# Patient Record
Sex: FEMALE | Race: WHITE | Employment: FULL TIME | URBAN - METROPOLITAN AREA
[De-identification: names, ages, dates, MRNs, and addresses within clinical notes are randomized per-mention and may not be internally consistent; named-entity substitution may affect disease eponyms.]

---

## 2018-08-14 ENCOUNTER — MEDICAL CORRESPONDENCE (OUTPATIENT)
Dept: HEALTH INFORMATION MANAGEMENT | Facility: CLINIC | Age: 38
End: 2018-08-14

## 2018-08-15 ENCOUNTER — PRE VISIT (OUTPATIENT)
Dept: ONCOLOGY | Facility: CLINIC | Age: 38
End: 2018-08-15

## 2018-08-15 NOTE — TELEPHONE ENCOUNTER
Date of appointment: 8/23/18   Diagnosis/reason for appointment:  Easy Bruising  Referring provider/facility:  [facility], Dr. Kathe Galvan  Who called:  Call to PT    Additional information:    August 15, 2018   Records from [facility] and [facility]  in [place]  Referral from [facility] faxed to HIM

## 2018-08-23 ENCOUNTER — HOSPITAL ENCOUNTER (OUTPATIENT)
Facility: CLINIC | Age: 38
Setting detail: SPECIMEN
Discharge: HOME | End: 2018-08-23
Attending: INTERNAL MEDICINE | Admitting: INTERNAL MEDICINE
Payer: COMMERCIAL

## 2018-08-23 ENCOUNTER — ONCOLOGY VISIT (OUTPATIENT)
Dept: ONCOLOGY | Facility: CLINIC | Age: 38
End: 2018-08-23
Attending: INTERNAL MEDICINE
Payer: COMMERCIAL

## 2018-08-23 ENCOUNTER — HOSPITAL ENCOUNTER (OUTPATIENT)
Facility: CLINIC | Age: 38
Setting detail: SPECIMEN
End: 2018-08-23
Attending: INTERNAL MEDICINE
Payer: COMMERCIAL

## 2018-08-23 ENCOUNTER — TELEPHONE (OUTPATIENT)
Dept: ONCOLOGY | Facility: CLINIC | Age: 38
End: 2018-08-23

## 2018-08-23 VITALS
TEMPERATURE: 98.6 F | SYSTOLIC BLOOD PRESSURE: 138 MMHG | OXYGEN SATURATION: 100 % | RESPIRATION RATE: 18 BRPM | HEART RATE: 121 BPM | DIASTOLIC BLOOD PRESSURE: 93 MMHG | WEIGHT: 199 LBS

## 2018-08-23 DIAGNOSIS — R23.3 EASY BRUISING: ICD-10-CM

## 2018-08-23 LAB
ALBUMIN SERPL-MCNC: 3.5 G/DL (ref 3.4–5)
ALP SERPL-CCNC: 104 U/L (ref 40–150)
ALT SERPL W P-5'-P-CCNC: 40 U/L (ref 0–50)
ANION GAP SERPL CALCULATED.3IONS-SCNC: 12 MMOL/L (ref 3–14)
APTT PPP: 24 SEC (ref 22–37)
AST SERPL W P-5'-P-CCNC: 42 U/L (ref 0–45)
BASOPHILS # BLD AUTO: 0.1 10E9/L (ref 0–0.2)
BASOPHILS NFR BLD AUTO: 0.4 %
BILIRUB SERPL-MCNC: 0.4 MG/DL (ref 0.2–1.3)
BUN SERPL-MCNC: 19 MG/DL (ref 7–30)
CALCIUM SERPL-MCNC: 9.3 MG/DL (ref 8.5–10.1)
CHLORIDE SERPL-SCNC: 101 MMOL/L (ref 94–109)
CLOSURE TME COLL+EPINEP BLD: NORMAL SEC
CO2 SERPL-SCNC: 22 MMOL/L (ref 20–32)
COPATH REPORT: NORMAL
CREAT SERPL-MCNC: 1.08 MG/DL (ref 0.52–1.04)
DIFFERENTIAL METHOD BLD: ABNORMAL
EOSINOPHIL # BLD AUTO: 0.1 10E9/L (ref 0–0.7)
EOSINOPHIL NFR BLD AUTO: 0.4 %
ERYTHROCYTE [DISTWIDTH] IN BLOOD BY AUTOMATED COUNT: 13.6 % (ref 10–15)
FERRITIN SERPL-MCNC: 67 NG/ML (ref 12–150)
FOLATE SERPL-MCNC: 9.7 NG/ML
GFR SERPL CREATININE-BSD FRML MDRD: 57 ML/MIN/1.7M2
GLUCOSE SERPL-MCNC: 84 MG/DL (ref 70–99)
HCT VFR BLD AUTO: 44.1 % (ref 35–47)
HGB BLD-MCNC: 14.5 G/DL (ref 11.7–15.7)
IMM GRANULOCYTES # BLD: 0.2 10E9/L (ref 0–0.4)
IMM GRANULOCYTES NFR BLD: 1.3 %
INR PPP: 1.01 (ref 0.86–1.14)
IRON SATN MFR SERPL: 47 % (ref 15–46)
IRON SERPL-MCNC: 128 UG/DL (ref 35–180)
LDH SERPL L TO P-CCNC: 220 U/L (ref 81–234)
LYMPHOCYTES # BLD AUTO: 4 10E9/L (ref 0.8–5.3)
LYMPHOCYTES NFR BLD AUTO: 26.6 %
MCH RBC QN AUTO: 31.3 PG (ref 26.5–33)
MCHC RBC AUTO-ENTMCNC: 32.9 G/DL (ref 31.5–36.5)
MCV RBC AUTO: 95 FL (ref 78–100)
MONOCYTES # BLD AUTO: 1.1 10E9/L (ref 0–1.3)
MONOCYTES NFR BLD AUTO: 7.5 %
NEUTROPHILS # BLD AUTO: 9.5 10E9/L (ref 1.6–8.3)
NEUTROPHILS NFR BLD AUTO: 63.8 %
NRBC # BLD AUTO: 0 10*3/UL
NRBC BLD AUTO-RTO: 0 /100
PLATELET # BLD AUTO: 245 10E9/L (ref 150–450)
POTASSIUM SERPL-SCNC: 4.3 MMOL/L (ref 3.4–5.3)
PROT SERPL-MCNC: 6.8 G/DL (ref 6.8–8.8)
RBC # BLD AUTO: 4.64 10E12/L (ref 3.8–5.2)
RETICS # AUTO: 152.7 10E9/L (ref 25–95)
RETICS/RBC NFR AUTO: 3.3 % (ref 0.5–2)
SODIUM SERPL-SCNC: 135 MMOL/L (ref 133–144)
T4 FREE SERPL-MCNC: 0.99 NG/DL (ref 0.76–1.46)
TIBC SERPL-MCNC: 274 UG/DL (ref 240–430)
TSH SERPL DL<=0.005 MIU/L-ACNC: 6.21 MU/L (ref 0.4–4)
VIT B12 SERPL-MCNC: 639 PG/ML (ref 193–986)
WBC # BLD AUTO: 14.9 10E9/L (ref 4–11)

## 2018-08-23 PROCEDURE — 80053 COMPREHEN METABOLIC PANEL: CPT | Performed by: INTERNAL MEDICINE

## 2018-08-23 PROCEDURE — 86704 HEP B CORE ANTIBODY TOTAL: CPT | Performed by: INTERNAL MEDICINE

## 2018-08-23 PROCEDURE — 40000847 ZZHCL STATISTIC MORPHOLOGY W/INTERP HISTOLOGY TC 85060: Performed by: INTERNAL MEDICINE

## 2018-08-23 PROCEDURE — G0463 HOSPITAL OUTPT CLINIC VISIT: HCPCS

## 2018-08-23 PROCEDURE — 00000402 ZZHCL STATISTIC TOTAL PROTEIN: Performed by: INTERNAL MEDICINE

## 2018-08-23 PROCEDURE — 82746 ASSAY OF FOLIC ACID SERUM: CPT | Performed by: INTERNAL MEDICINE

## 2018-08-23 PROCEDURE — 86706 HEP B SURFACE ANTIBODY: CPT | Performed by: INTERNAL MEDICINE

## 2018-08-23 PROCEDURE — 83615 LACTATE (LD) (LDH) ENZYME: CPT | Performed by: INTERNAL MEDICINE

## 2018-08-23 PROCEDURE — 85240 CLOT FACTOR VIII AHG 1 STAGE: CPT | Performed by: INTERNAL MEDICINE

## 2018-08-23 PROCEDURE — 83540 ASSAY OF IRON: CPT | Performed by: INTERNAL MEDICINE

## 2018-08-23 PROCEDURE — 85245 CLOT FACTOR VIII VW RISTOCTN: CPT | Performed by: INTERNAL MEDICINE

## 2018-08-23 PROCEDURE — 85045 AUTOMATED RETICULOCYTE COUNT: CPT | Performed by: INTERNAL MEDICINE

## 2018-08-23 PROCEDURE — 00000401 ZZHCL STATISTIC THROMBIN TIME NC: Performed by: INTERNAL MEDICINE

## 2018-08-23 PROCEDURE — 85025 COMPLETE CBC W/AUTO DIFF WBC: CPT | Performed by: INTERNAL MEDICINE

## 2018-08-23 PROCEDURE — 85613 RUSSELL VIPER VENOM DILUTED: CPT | Performed by: INTERNAL MEDICINE

## 2018-08-23 PROCEDURE — 85060 BLOOD SMEAR INTERPRETATION: CPT | Performed by: INTERNAL MEDICINE

## 2018-08-23 PROCEDURE — 36415 COLL VENOUS BLD VENIPUNCTURE: CPT

## 2018-08-23 PROCEDURE — 82607 VITAMIN B-12: CPT | Performed by: INTERNAL MEDICINE

## 2018-08-23 PROCEDURE — 85610 PROTHROMBIN TIME: CPT | Performed by: INTERNAL MEDICINE

## 2018-08-23 PROCEDURE — 84165 PROTEIN E-PHORESIS SERUM: CPT | Performed by: INTERNAL MEDICINE

## 2018-08-23 PROCEDURE — 84439 ASSAY OF FREE THYROXINE: CPT | Performed by: INTERNAL MEDICINE

## 2018-08-23 PROCEDURE — 99203 OFFICE O/P NEW LOW 30 MIN: CPT | Performed by: INTERNAL MEDICINE

## 2018-08-23 PROCEDURE — 82728 ASSAY OF FERRITIN: CPT | Performed by: INTERNAL MEDICINE

## 2018-08-23 PROCEDURE — 85246 CLOT FACTOR VIII VW ANTIGEN: CPT | Performed by: INTERNAL MEDICINE

## 2018-08-23 PROCEDURE — 85730 THROMBOPLASTIN TIME PARTIAL: CPT | Performed by: INTERNAL MEDICINE

## 2018-08-23 PROCEDURE — 86803 HEPATITIS C AB TEST: CPT | Performed by: INTERNAL MEDICINE

## 2018-08-23 PROCEDURE — 85732 THROMBOPLASTIN TIME PARTIAL: CPT | Performed by: INTERNAL MEDICINE

## 2018-08-23 PROCEDURE — 84443 ASSAY THYROID STIM HORMONE: CPT | Performed by: INTERNAL MEDICINE

## 2018-08-23 PROCEDURE — 87340 HEPATITIS B SURFACE AG IA: CPT | Performed by: INTERNAL MEDICINE

## 2018-08-23 PROCEDURE — 83550 IRON BINDING TEST: CPT | Performed by: INTERNAL MEDICINE

## 2018-08-23 RX ORDER — SPIRONOLACTONE 50 MG/1
50 TABLET, FILM COATED ORAL
COMMUNITY
Start: 2018-04-12

## 2018-08-23 RX ORDER — MINOCYCLINE HYDROCHLORIDE 100 MG/1
100 CAPSULE ORAL
COMMUNITY
Start: 2018-04-12

## 2018-08-23 RX ORDER — HYDROXYZINE HYDROCHLORIDE 25 MG/1
25-50 TABLET, FILM COATED ORAL
COMMUNITY
Start: 2018-08-22

## 2018-08-23 RX ORDER — CLONAZEPAM 2 MG/1
2 TABLET ORAL
COMMUNITY
Start: 2018-08-22

## 2018-08-23 RX ORDER — METAXALONE 800 MG/1
800 TABLET ORAL
COMMUNITY
Start: 2018-08-10

## 2018-08-23 RX ORDER — FLUTICASONE PROPIONATE 50 MCG
1 SPRAY, SUSPENSION (ML) NASAL
COMMUNITY
Start: 2018-01-08

## 2018-08-23 RX ORDER — VENLAFAXINE HYDROCHLORIDE 75 MG/1
75 CAPSULE, EXTENDED RELEASE ORAL
COMMUNITY
Start: 2018-08-22

## 2018-08-23 RX ORDER — TAZAROTENE 1 MG/G
GEL TOPICAL
COMMUNITY
Start: 2017-03-30

## 2018-08-23 RX ORDER — LORAZEPAM 1 MG/1
1-2 TABLET ORAL
COMMUNITY
Start: 2018-08-22

## 2018-08-23 RX ORDER — DROSPIRENONE AND ETHINYL ESTRADIOL 0.03MG-3MG
1 KIT ORAL
COMMUNITY
Start: 2018-08-02

## 2018-08-23 RX ORDER — LURASIDONE HYDROCHLORIDE 60 MG/1
60 TABLET, FILM COATED ORAL
COMMUNITY
Start: 2018-08-22

## 2018-08-23 RX ORDER — LAMOTRIGINE 150 MG/1
150 TABLET ORAL
COMMUNITY
Start: 2018-08-22

## 2018-08-23 ASSESSMENT — PAIN SCALES - GENERAL: PAINLEVEL: NO PAIN (0)

## 2018-08-23 NOTE — LETTER
8/23/2018         RE: Renee Healy  [address] [address]        Dear Colleague,    Thank you for referring your patient, Renee Healy, to the [facility]. Please see a copy of my visit note below.    [facility]    Hematology/Oncology New Patient Note      Today's Date: 08/23/18    Reason for Consult: easy bruising      HISTORY OF PRESENT ILLNESS: Renee Healy is a 37 year old female with PMHx of bipolar disorder who presents with easy bruising.  Renee says that since January 2018, she started getting bruising on her left thigh, which haven't gone away.  She also started getting bruising on her bilateral calves, which looked like varicose veins.  She denies history of easy bruising prior to that.  She denies frequent nosebleeds or other bleeding symptoms.  She has had a gallbladder surgery in the past, as well as an exploratory abdominal surgery for ectopic pregnancy.  She denies any excessive bleeding or clotting problems with those surgeries.  She has never given birth.  She does not take blood thinners or aspirin.  She denies family history of easy bleeding or clotting disorders.      She smokes 3-4 cigarettes a day for the past 20 years.  She rarely drinks alcohol.  She lives alone in [place].  She is a  in the ED at [place].  Her paternal grandfather had multiple myeloma.  Her paternal grandmother had breast cancer.          REVIEW OF SYSTEMS:   14 point ROS was reviewed and is negative other than as noted above in HPI.       HOME MEDICATIONS:  Current Outpatient Prescriptions   Medication Sig Dispense Refill     cholecalciferol (VITAMIN D3) 5000 units TABS tablet        clonazePAM (KLONOPIN) 2 MG tablet Take 2 mg by mouth       drospirenone-ethinyl estradiol (CATHY) 3-0.03 MG per tablet Take 1 tablet by mouth       fluticasone (FLONASE) 50 MCG/ACT spray Spray 1 spray in nostril       hydrOXYzine (ATARAX) 25 MG  tablet Take 25-50 mg by mouth       lamoTRIgine (LAMICTAL) 150 MG tablet Take 150 mg by mouth       LORazepam (ATIVAN) 1 MG tablet Take 1-2 mg by mouth       lurasidone (LATUDA) 60 MG TABS tablet Take 60 mg by mouth       metaxalone (SKELAXIN) 800 MG tablet Take 800 mg by mouth       minocycline (MINOCIN/DYNACIN) 100 MG capsule Take 100 mg by mouth       spironolactone (ALDACTONE) 50 MG tablet Take 50 mg by mouth       tazarotene (TAZORAC) 0.1 % topical gel        venlafaxine (EFFEXOR-XR) 75 MG 24 hr capsule Take 75 mg by mouth           ALLERGIES:  No Known Allergies      PAST MEDICAL HISTORY:  History reviewed. No pertinent past medical history.      PAST SURGICAL HISTORY:  Past Surgical History:   Procedure Laterality Date     CHOLECYSTECTOMY           SOCIAL HISTORY:  Social History     Social History     Marital status: Single     Spouse name: N/A     Number of children: N/A     Years of education: N/A     Occupational History     Not on file.     Social History Main Topics     Smoking status: Current Every Day Smoker     Smokeless tobacco: Never Used     Alcohol use Not on file     Drug use: Not on file     Sexual activity: Not on file     Other Topics Concern     Not on file     Social History Narrative     No narrative on file     As per HPI.    FAMILY HISTORY:  As per HPI.      PHYSICAL EXAM:  Vital signs:  BP (!) 138/93 (BP Location: Right arm, Patient Position: Chair, Cuff Size: Adult Large)  Pulse 121  Temp 98.6  F (37  C) (Tympanic)  Resp 18  Wt 90.3 kg (199 lb)  SpO2 100%   ECO  GENERAL/CONSTITUTIONAL: No acute distress.  EYES: Pupils are equal, round, and react to light and accommodation. Extraocular movements intact.  No scleral icterus.  ENT/MOUTH: Neck supple. Oropharynx clear, no mucositis.  LYMPH: No anterior cervical, posterior cervical, supraclavicular, axillary or inguinal adenopathy.   RESPIRATORY: Clear to auscultation bilaterally. No crackles or wheezing.   CARDIOVASCULAR: Regular  rate and rhythm without murmurs, gallops, or rubs.  GASTROINTESTINAL: No hepatosplenomegaly, masses, or tenderness. The patient has normal bowel sounds. No guarding.  No distention.  MUSCULOSKELETAL: Warm and well-perfused, no cyanosis, clubbing, or edema.  NEUROLOGIC: Cranial nerves II-XII are intact. Alert, oriented, answers questions appropriately.  INTEGUMENTARY: No rashes or jaundice.  GAIT: Steady, does not use assistive device      LABS:  Pending.        ASSESSMENT/PLAN:  Renee Healy is a 37 year old female with:    1) Easy bruising: She has a cluster of bruises on her left thigh.  She also has what appears to be varicose veins in her bilateral calves.  She has never had easy bruising issues prior to January 2018 and has not had complications with past surgeries.  We will run some lab tests today, and screen for some bleeding disorders.  -labs today: CBC, CMP, INR/PT, PTT, LDH, hepatitis screen, ferritin, folate, vitamin B12, TSH, von Willebrand screen, lupus anticoagulant, peripheral smear, PFA-100 (may need to go to the [facility] [place] for this)  -patient requests to be called with the results.     2) Bipolar disorder:  -followed by psychiatry      I spent a total of 30 minutes with the patient, with over >50% of the time in counseling and/or coordination of care.       Yissel Ochoa MD  Hematology/Oncology  [facility]      Again, thank you for allowing me to participate in the care of your patient.        Sincerely,        Yissel Ochoa MD

## 2018-08-23 NOTE — PROGRESS NOTES
[facility]    Hematology/Oncology New Patient Note      Today's Date: 08/23/18    Reason for Consult: easy bruising      HISTORY OF PRESENT ILLNESS: Renee Healy is a 37 year old female with PMHx of bipolar disorder who presents with easy bruising.  Renee says that since January 2018, she started getting bruising on her left thigh, which haven't gone away.  She also started getting bruising on her bilateral calves, which looked like varicose veins.  She denies history of easy bruising prior to that.  She denies frequent nosebleeds or other bleeding symptoms.  She has had a gallbladder surgery in the past, as well as an exploratory abdominal surgery for ectopic pregnancy.  She denies any excessive bleeding or clotting problems with those surgeries.  She has never given birth.  She does not take blood thinners or aspirin.  She denies family history of easy bleeding or clotting disorders.      She smokes 3-4 cigarettes a day for the past 20 years.  She rarely drinks alcohol.  She lives alone in [place].  She is a  in the ED at [place].  Her paternal grandfather had multiple myeloma.  Her paternal grandmother had breast cancer.          REVIEW OF SYSTEMS:   14 point ROS was reviewed and is negative other than as noted above in HPI.       HOME MEDICATIONS:  Current Outpatient Prescriptions   Medication Sig Dispense Refill     cholecalciferol (VITAMIN D3) 5000 units TABS tablet        clonazePAM (KLONOPIN) 2 MG tablet Take 2 mg by mouth       drospirenone-ethinyl estradiol (CATHY) 3-0.03 MG per tablet Take 1 tablet by mouth       fluticasone (FLONASE) 50 MCG/ACT spray Spray 1 spray in nostril       hydrOXYzine (ATARAX) 25 MG tablet Take 25-50 mg by mouth       lamoTRIgine (LAMICTAL) 150 MG tablet Take 150 mg by mouth       LORazepam (ATIVAN) 1 MG tablet Take 1-2 mg by mouth       lurasidone (LATUDA) 60 MG TABS tablet Take 60 mg by mouth       metaxalone (SKELAXIN) 800 MG  tablet Take 800 mg by mouth       minocycline (MINOCIN/DYNACIN) 100 MG capsule Take 100 mg by mouth       spironolactone (ALDACTONE) 50 MG tablet Take 50 mg by mouth       tazarotene (TAZORAC) 0.1 % topical gel        venlafaxine (EFFEXOR-XR) 75 MG 24 hr capsule Take 75 mg by mouth           ALLERGIES:  No Known Allergies      PAST MEDICAL HISTORY:  History reviewed. No pertinent past medical history.      PAST SURGICAL HISTORY:  Past Surgical History:   Procedure Laterality Date     CHOLECYSTECTOMY           SOCIAL HISTORY:  Social History     Social History     Marital status: Single     Spouse name: N/A     Number of children: N/A     Years of education: N/A     Occupational History     Not on file.     Social History Main Topics     Smoking status: Current Every Day Smoker     Smokeless tobacco: Never Used     Alcohol use Not on file     Drug use: Not on file     Sexual activity: Not on file     Other Topics Concern     Not on file     Social History Narrative     No narrative on file     As per HPI.    FAMILY HISTORY:  As per HPI.      PHYSICAL EXAM:  Vital signs:  BP (!) 138/93 (BP Location: Right arm, Patient Position: Chair, Cuff Size: Adult Large)  Pulse 121  Temp 98.6  F (37  C) (Tympanic)  Resp 18  Wt 90.3 kg (199 lb)  SpO2 100%   ECO  GENERAL/CONSTITUTIONAL: No acute distress.  EYES: Pupils are equal, round, and react to light and accommodation. Extraocular movements intact.  No scleral icterus.  ENT/MOUTH: Neck supple. Oropharynx clear, no mucositis.  LYMPH: No anterior cervical, posterior cervical, supraclavicular, axillary or inguinal adenopathy.   RESPIRATORY: Clear to auscultation bilaterally. No crackles or wheezing.   CARDIOVASCULAR: Regular rate and rhythm without murmurs, gallops, or rubs.  GASTROINTESTINAL: No hepatosplenomegaly, masses, or tenderness. The patient has normal bowel sounds. No guarding.  No distention.  MUSCULOSKELETAL: Warm and well-perfused, no cyanosis, clubbing, or  edema.  NEUROLOGIC: Cranial nerves II-XII are intact. Alert, oriented, answers questions appropriately.  INTEGUMENTARY: No rashes or jaundice.  GAIT: Steady, does not use assistive device      LABS:  Pending.        ASSESSMENT/PLAN:  Renee Healy is a 37 year old female with:    1) Easy bruising: She has a cluster of bruises on her left thigh.  She also has what appears to be varicose veins in her bilateral calves.  She has never had easy bruising issues prior to January 2018 and has not had complications with past surgeries.  We will run some lab tests today, and screen for some bleeding disorders.  -labs today: CBC, CMP, INR/PT, PTT, LDH, hepatitis screen, ferritin, folate, vitamin B12, TSH, von Willebrand screen, lupus anticoagulant, peripheral smear, PFA-100 (may need to go to the [facility] for this)  -patient requests to be called with the results.     2) Bipolar disorder:  -followed by psychiatry      I spent a total of 30 minutes with the patient, with over >50% of the time in counseling and/or coordination of care.       Yissel Ochoa MD  Hematology/Oncology  [facility]

## 2018-08-23 NOTE — TELEPHONE ENCOUNTER
Received call from the lab that the platelet function time test cannot be done with the tubes that were sent because they were sent through the pneumatic tube system.  The test is sensitive and the sample can't be sent in the tube system.  Notified patient of this and she will go directly to the lab at [facility] to have it redrawn.  They need 2-3 more blue tubes.

## 2018-08-23 NOTE — MR AVS SNAPSHOT
"              After Visit Summary   2018    Renee Healy    MRN: [id number]           Patient Information     Date Of Birth          1980        Visit Information        Provider Department      2018 1:15 PM Yissel Ochoa MD [facility] On[facility]      Today's Diagnoses     Easy bruising          Care Instructions        -labs today-LS    -will call you with  results          Follow-ups after your visit        Who to contact     If you have questions or need follow up information about today's clinic visit or your schedule please contact [facility] directly at [phone].  Normal or non-critical lab and imaging results will be communicated to you by MyChart, letter or phone within 4 business days after the clinic has received the results. If you do not hear from us within 7 days, please contact the clinic through [employer] or phone. If you have a critical or abnormal lab result, we will notify you by phone as soon as possible.  Submit refill requests through [employer] or call your pharmacy and they will forward the refill request to us. Please allow 3 business days for your refill to be completed.          Additional Information About Your Visit        MyChart Information     [employer] lets you send messages to your doctor, view your test results, renew your prescriptions, schedule appointments and more. To sign up, go to [email or web address] . Click on \"Log in\" on the left side of the screen, which will take you to the Welcome page. Then click on \"Sign up Now\" on the right side of the page.     You will be asked to enter the access code listed below, as well as some personal information. Please follow the directions to create your username and password.     Your access code is: [email or web address]  Expires: 2018  2:04 PM     Your access code will  in 90 days. If you need help or a new code, please call your [place] clinic or " [phone].        Care EveryWhere ID     This is your Care EveryWhere ID. This could be used by other organizations to access your [place] medical records  [id number]        Your Vitals Were     Pulse Temperature Respirations Pulse Oximetry          121 98.6  F (37  C) (Tympanic) 18 100%         Blood Pressure from Last 3 Encounters:   08/23/18 (!) 138/93    Weight from Last 3 Encounters:   08/23/18 90.3 kg (199 lb)              We Performed the Following     Blood Morphology Pathologist Review     CBC with platelets differential     Comprehensive metabolic panel     Factor 8 assay     Ferritin     Folate     Hepatitis B core antibody     Hepatitis B Surface Antibody     Hepatitis B surface antigen     Hepatitis C antibody     INR     Iron and iron binding capacity     Lactate Dehydrogenase     Lupus Anticoagulant Panel     Partial thromboplastin time     Platelet function closure with reflex     Protein electrophoresis     PTT mixing studies     Reticulocyte count     TSH with free T4 reflex     Vitamin B12     Von Willebrand antigen     von Willebrand Factor Activity     von Willebrand Interpretation        Primary Care Provider Office Phone # Fax #    Kathe Hargroves, FELIX [phone]       [facility] [place] [address]  [place] [address]        Equal Access to Services     [facility]: Hadii aad ku hadasho Soomaali, waaxda luqadaha, qaybta kaalmada adeegyada, waxay idiin hayaan jenae moore . So [facility] [phone].    ATENCIÓN: Si habla español, tiene a [place] disposición servicios gratuitos de asistencia lingüística. Fabricio al [phone].    We comply with applicable federal civil rights laws and [place] laws. We do not discriminate on the basis of race, color, national origin, age, disability, sex, sexual orientation, or gender identity.            Thank you!     Thank you for choosing [facility]  for your care. Our goal is always to  provide you with excellent care. Hearing back from our patients is one way we can continue to improve our services. Please take a few minutes to complete the written survey that you may receive in the mail after your visit with us. Thank you!             Your Updated Medication List - Protect others around you: Learn how to safely use, store and throw away your medicines at www.disposemymeds.org.          This list is accurate as of 8/23/18  2:04 PM.  Always use your most recent med list.                   Brand Name Dispense Instructions for use Diagnosis    cholecalciferol 5000 units Tabs tablet    vitamin D3      Easy bruising       clonazePAM 2 MG tablet    klonoPIN     Take 2 mg by mouth    Easy bruising       drospirenone-ethinyl estradiol 3-0.03 MG per tablet    CATHY     Take 1 tablet by mouth    Easy bruising       fluticasone 50 MCG/ACT spray    FLONASE     Spray 1 spray in nostril    Easy bruising       hydrOXYzine 25 MG tablet    ATARAX     Take 25-50 mg by mouth    Easy bruising       lamoTRIgine 150 MG tablet    LaMICtal     Take 150 mg by mouth    Easy bruising       LORazepam 1 MG tablet    ATIVAN     Take 1-2 mg by mouth    Easy bruising       lurasidone 60 MG Tabs tablet    LATUDA     Take 60 mg by mouth    Easy bruising       metaxalone 800 MG tablet    SKELAXIN     Take 800 mg by mouth    Easy bruising       minocycline 100 MG capsule    MINOCIN/DYNACIN     Take 100 mg by mouth    Easy bruising       spironolactone 50 MG tablet    ALDACTONE     Take 50 mg by mouth    Easy bruising       tazarotene 0.1 % topical gel    TAZORAC      Easy bruising       venlafaxine 75 MG 24 hr capsule    EFFEXOR-XR     Take 75 mg by mouth    Easy bruising

## 2018-08-23 NOTE — NURSING NOTE
Oncology Rooming Note    August 23, 2018 1:01 PM   Renee Healy is a 37 year old female who presents for:    Chief Complaint   Patient presents with     Consult     Initial Vitals: BP (!) 138/93 (BP Location: Right arm, Patient Position: Chair, Cuff Size: Adult Large)  Pulse 121  Temp 98.6  F (37  C) (Tympanic)  Resp 18  Wt 90.3 kg (199 lb)  SpO2 100% There is no height or weight on file to calculate BMI. There is no height or weight on file to calculate BSA.  No Pain (0) Comment: Data Unavailable   No LMP recorded. Patient is not currently having periods (Reason: Birth Control).  Allergies reviewed: Yes  Medications reviewed: Yes    Medications: Medication refills not needed today.  Pharmacy name entered into [facility]: [facility] [address], [address]    Clinical concerns: none     5 minutes for nursing intake (face to face time)     Jenelle Flores CMA            DISCHARGE PLAN:  Next appointments: See patient instruction section  Departure Mode: Ambulatory  Accompanied by: self  0 minutes for nursing discharge (face to face time)   Jenelle Flores CMA

## 2018-08-24 LAB
ALBUMIN SERPL ELPH-MCNC: 4.1 G/DL (ref 3.7–5.1)
ALPHA1 GLOB SERPL ELPH-MCNC: 0.7 G/DL (ref 0.2–0.4)
ALPHA2 GLOB SERPL ELPH-MCNC: 0.7 G/DL (ref 0.5–0.9)
APTT IMM NP PPP: NORMAL SEC (ref 31–45)
APTT IMM NP PPP: NORMAL SEC (ref 31–45)
B-GLOBULIN SERPL ELPH-MCNC: 0.6 G/DL (ref 0.6–1)
GAMMA GLOB SERPL ELPH-MCNC: 0.7 G/DL (ref 0.7–1.6)
HBV CORE AB SERPL QL IA: NONREACTIVE
HBV SURFACE AB SERPL IA-ACNC: 39.72 M[IU]/ML
HBV SURFACE AG SERPL QL IA: NONREACTIVE
HCV AB SERPL QL IA: NONREACTIVE
M PROTEIN SERPL ELPH-MCNC: 0 G/DL
PROT PATTERN SERPL ELPH-IMP: ABNORMAL

## 2018-08-26 ENCOUNTER — HOSPITAL ENCOUNTER (OUTPATIENT)
Dept: LAB | Facility: CLINIC | Age: 38
Discharge: HOME | End: 2018-08-26
Attending: INTERNAL MEDICINE | Admitting: INTERNAL MEDICINE
Payer: COMMERCIAL

## 2018-08-26 DIAGNOSIS — R23.3 EASY BRUISING: ICD-10-CM

## 2018-08-26 LAB — CLOSURE TME COLL+EPINEP BLD: 99 SEC

## 2018-08-26 PROCEDURE — 85576 BLOOD PLATELET AGGREGATION: CPT | Performed by: INTERNAL MEDICINE

## 2018-08-26 PROCEDURE — 36415 COLL VENOUS BLD VENIPUNCTURE: CPT | Performed by: INTERNAL MEDICINE

## 2018-08-27 LAB
FACT VIII ACT/NOR PPP: 157 % (ref 55–200)
LA PPP-IMP: NEGATIVE
VWF CBA/VWF AG PPP IA-RTO: 129 % (ref 50–200)
VWF:AC ACT/NOR PPP IA: 110 % (ref 50–180)

## 2018-08-28 LAB — VON WILLEBRAND INTERPRETATION: NORMAL

## 2018-12-21 ENCOUNTER — TELEPHONE (OUTPATIENT)
Dept: PSYCHIATRY | Facility: CLINIC | Age: 38
End: 2018-12-21

## 2018-12-21 NOTE — TELEPHONE ENCOUNTER
PSYCHIATRY CLINIC PHONE INTAKE     SERVICES REQUESTED / INTERESTED IN          Med Management    Presenting Problem and Brief History                              What would you like to be seen for? (brief description):  Patient reports feeling more depressed and was seen 12/20/18 in ER for suicidal ideation with a plan. She reports being in a manic state a couple times which lasted for one month and then a couple months. During that time she reports being more promiscuous, having higher energy, and increased shopping. Physical symptoms of anxiety include racing heart, insomnia, and feeling a pit in stomach.  Have you received a mental health diagnosis? Yes   Which one (s): Bipolar, depression, anxiety  Is there any history of developmental delay?  No   Are you currently seeing a mental health provider?  Yes            Who / month last seen:  Dr. Alejo Service at [facility]. Feels current psychiatrist is not listening to her  Do you have mental health records elsewhere?  Yes  Will you sign a release so we can obtain them?  Yes    Have you ever been hospitalized for psychiatric reasons?  Yes  Describe:  12/20/18 - Suicidal ideation was very prominent, came up with plan but reached out for help    Do you have current thoughts of self-harm?  Yes    Do you currently have thoughts of harming others?  No       Substance Use History     Do you have any history of alcohol / illicit drug use?  No  Describe:    Have you ever received treatment for this?  No    Describe:       Social History     Does the patient have a guardian?  No    Name / number:   Have you had an ACT team in last 12 months?  No  Describe:    Do you have any current or past legal issues?  No  Describe:    OK to leave a detailed voicemail?  Yes    Medical/ Surgical History                                   Patient Active Problem List   Diagnosis     Easy bruising          Medications             Current Outpatient Medications   Medication Sig  Dispense Refill     cholecalciferol (VITAMIN D3) 5000 units TABS tablet        clonazePAM (KLONOPIN) 2 MG tablet Take 2 mg by mouth       drospirenone-ethinyl estradiol (CATHY) 3-0.03 MG per tablet Take 1 tablet by mouth       fluticasone (FLONASE) 50 MCG/ACT spray Spray 1 spray in nostril       hydrOXYzine (ATARAX) 25 MG tablet Take 25-50 mg by mouth       lamoTRIgine (LAMICTAL) 150 MG tablet Take 150 mg by mouth       LORazepam (ATIVAN) 1 MG tablet Take 1-2 mg by mouth       lurasidone (LATUDA) 60 MG TABS tablet Take 60 mg by mouth       metaxalone (SKELAXIN) 800 MG tablet Take 800 mg by mouth       minocycline (MINOCIN/DYNACIN) 100 MG capsule Take 100 mg by mouth       spironolactone (ALDACTONE) 50 MG tablet Take 50 mg by mouth       tazarotene (TAZORAC) 0.1 % topical gel        venlafaxine (EFFEXOR-XR) 75 MG 24 hr capsule Take 75 mg by mouth       lamictal  latuda  effexor 37.5mg and 75mg  Clonazepam  Hydroxyzine  Ativan PRN    DISPOSITION      Completed phone screen with patient. Scheduled with [facility] clinic per patient request.    Chery Barron,

## 2021-06-01 ENCOUNTER — RECORDS - HEALTHEAST (OUTPATIENT)
Dept: ADMINISTRATIVE | Facility: CLINIC | Age: 41
End: 2021-06-01

## 2025-06-10 ENCOUNTER — TRANSFERRED RECORDS (OUTPATIENT)
Dept: HEALTH INFORMATION MANAGEMENT | Facility: CLINIC | Age: 45
End: 2025-06-10
Payer: COMMERCIAL

## 2025-06-28 ENCOUNTER — HEALTH MAINTENANCE LETTER (OUTPATIENT)
Age: 45
End: 2025-06-28